# Patient Record
Sex: FEMALE | Race: WHITE
[De-identification: names, ages, dates, MRNs, and addresses within clinical notes are randomized per-mention and may not be internally consistent; named-entity substitution may affect disease eponyms.]

---

## 2017-04-02 ENCOUNTER — HOSPITAL ENCOUNTER (EMERGENCY)
Dept: HOSPITAL 17 - NEPC | Age: 32
Discharge: HOME | End: 2017-04-02
Payer: SELF-PAY

## 2017-04-02 VITALS
SYSTOLIC BLOOD PRESSURE: 173 MMHG | TEMPERATURE: 98.1 F | HEART RATE: 114 BPM | OXYGEN SATURATION: 95 % | RESPIRATION RATE: 20 BRPM | DIASTOLIC BLOOD PRESSURE: 108 MMHG

## 2017-04-02 VITALS — SYSTOLIC BLOOD PRESSURE: 124 MMHG | DIASTOLIC BLOOD PRESSURE: 69 MMHG

## 2017-04-02 VITALS — HEIGHT: 66 IN | WEIGHT: 293 LBS | BODY MASS INDEX: 47.09 KG/M2

## 2017-04-02 VITALS — RESPIRATION RATE: 18 BRPM | HEART RATE: 94 BPM

## 2017-04-02 VITALS
HEART RATE: 108 BPM | SYSTOLIC BLOOD PRESSURE: 173 MMHG | RESPIRATION RATE: 20 BRPM | DIASTOLIC BLOOD PRESSURE: 77 MMHG | OXYGEN SATURATION: 100 %

## 2017-04-02 VITALS — SYSTOLIC BLOOD PRESSURE: 141 MMHG | DIASTOLIC BLOOD PRESSURE: 70 MMHG

## 2017-04-02 DIAGNOSIS — I49.8: ICD-10-CM

## 2017-04-02 DIAGNOSIS — R06.02: ICD-10-CM

## 2017-04-02 DIAGNOSIS — F17.210: ICD-10-CM

## 2017-04-02 DIAGNOSIS — R07.81: Primary | ICD-10-CM

## 2017-04-02 LAB
ALP SERPL-CCNC: 108 U/L (ref 45–117)
ALT SERPL-CCNC: 35 U/L (ref 10–53)
ANION GAP SERPL CALC-SCNC: 9 MEQ/L (ref 5–15)
APTT BLD: 29.2 SEC (ref 24.3–30.1)
AST SERPL-CCNC: 23 U/L (ref 15–37)
BASOPHILS # BLD AUTO: 0.1 TH/MM3 (ref 0–0.2)
BASOPHILS NFR BLD: 0.7 % (ref 0–2)
BILIRUB SERPL-MCNC: 0.2 MG/DL (ref 0.2–1)
BUN SERPL-MCNC: 8 MG/DL (ref 7–18)
CHLORIDE SERPL-SCNC: 104 MEQ/L (ref 98–107)
CK MB SERPL-MCNC: (no result) NG/ML (ref 0.5–3.6)
CK SERPL-CCNC: 104 U/L (ref 26–192)
EOSINOPHIL # BLD: 0.4 TH/MM3 (ref 0–0.4)
EOSINOPHIL NFR BLD: 3.4 % (ref 0–4)
ERYTHROCYTE [DISTWIDTH] IN BLOOD BY AUTOMATED COUNT: 17.4 % (ref 11.6–17.2)
GFR SERPLBLD BASED ON 1.73 SQ M-ARVRAT: 95 ML/MIN (ref 89–?)
HCO3 BLD-SCNC: 25.8 MEQ/L (ref 21–32)
HCT VFR BLD CALC: 35.1 % (ref 35–46)
HEMO FLAGS: (no result)
INR PPP: 0.9 RATIO
LYMPHOCYTES # BLD AUTO: 3 TH/MM3 (ref 1–4.8)
LYMPHOCYTES NFR BLD AUTO: 24 % (ref 9–44)
MAGNESIUM SERPL-MCNC: 1.9 MG/DL (ref 1.5–2.5)
MCH RBC QN AUTO: 21.5 PG (ref 27–34)
MCHC RBC AUTO-ENTMCNC: 31.1 % (ref 32–36)
MCV RBC AUTO: 69.3 FL (ref 80–100)
MONOCYTES NFR BLD: 5.2 % (ref 0–8)
NEUTROPHILS # BLD AUTO: 8.5 TH/MM3 (ref 1.8–7.7)
NEUTROPHILS NFR BLD AUTO: 66.7 % (ref 16–70)
OVALOCYTES BLD QL SMEAR: (no result)
PLAT MORPH BLD: NORMAL
PLATELET # BLD: 470 TH/MM3 (ref 150–450)
PLATELET BLD QL SMEAR: (no result)
POTASSIUM SERPL-SCNC: 3.8 MEQ/L (ref 3.5–5.1)
PROTHROMBIN TIME: 10.4 SEC (ref 9.8–11.6)
RBC # BLD AUTO: 5.07 MIL/MM3 (ref 4–5.3)
SCAN/DIFF: (no result)
SODIUM SERPL-SCNC: 139 MEQ/L (ref 136–145)
WBC # BLD AUTO: 12.7 TH/MM3 (ref 4–11)

## 2017-04-02 PROCEDURE — 71010: CPT

## 2017-04-02 PROCEDURE — 85025 COMPLETE CBC W/AUTO DIFF WBC: CPT

## 2017-04-02 PROCEDURE — 99285 EMERGENCY DEPT VISIT HI MDM: CPT

## 2017-04-02 PROCEDURE — 71275 CT ANGIOGRAPHY CHEST: CPT

## 2017-04-02 PROCEDURE — 82550 ASSAY OF CK (CPK): CPT

## 2017-04-02 PROCEDURE — 82552 ASSAY OF CPK IN BLOOD: CPT

## 2017-04-02 PROCEDURE — 80053 COMPREHEN METABOLIC PANEL: CPT

## 2017-04-02 PROCEDURE — 85730 THROMBOPLASTIN TIME PARTIAL: CPT

## 2017-04-02 PROCEDURE — 85610 PROTHROMBIN TIME: CPT

## 2017-04-02 PROCEDURE — 96361 HYDRATE IV INFUSION ADD-ON: CPT

## 2017-04-02 PROCEDURE — 83735 ASSAY OF MAGNESIUM: CPT

## 2017-04-02 PROCEDURE — 83880 ASSAY OF NATRIURETIC PEPTIDE: CPT

## 2017-04-02 PROCEDURE — 96374 THER/PROPH/DIAG INJ IV PUSH: CPT

## 2017-04-02 PROCEDURE — 85379 FIBRIN DEGRADATION QUANT: CPT

## 2017-04-02 PROCEDURE — 93005 ELECTROCARDIOGRAM TRACING: CPT

## 2017-04-02 PROCEDURE — 84484 ASSAY OF TROPONIN QUANT: CPT

## 2017-04-02 NOTE — PD
HPI


Chief Complaint:  Chest Pain


Time Seen by Provider:  13:32


Travel History


International Travel<30 days:  No


Contact w/Intl Traveler<30days:  No


Traveled to known affect area:  No





History of Present Illness


HPI


32-year-old female presents to the emergency department for evaluation of left 

anterior chest pain.  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  

Describes it as a constant pressure but with sharp pain caused by inspiration.  

States that she has some shortness of breath associated with this pain.  Denies 

any lightheadedness, dizziness, nausea, vomiting, diaphoresis, abdominal pain, 

swelling of the extremities.  Denies any recent cough or cold symptoms.  Denies 

any history of heart disease or MI.  Has never had a stress test or cardiac 

catheterization.  Denies any family history of heart disease.  Denies any 

history of blood clots, recent surgeries, malignancy or taking hormones.  She 

does have a 12 year history of smoking cigarettes.  Denies pregnancy, last 

menstrual period 2 weeks ago.  No other complaints.





PFSH


Past Medical History


Diabetes:  Yes


Pregnant?:  Not Pregnant





Social History


Alcohol Use:  No


Tobacco Use:  Yes





Allergies-Medications


(Allergen,Severity, Reaction):  


Coded Allergies:  


     No Known Allergies (Unverified , 4/2/17)





Review of Systems


Except as stated in HPI:  all other systems reviewed are Neg





Physical Exam


Narrative


GENERAL: Morbidly obese female patient in no acute distress who is nontoxic 

appearing.


SKIN: Warm and dry.


HEAD: Normocephalic and atraumatic. 


EYES: No injection, drainage, or hyphema noted.  PERRLA.  EOMI.  


ENT: No nasal drainage noted.  Oropharynx is clear.


NECK: Supple and the trachea is midline.


CARDIOVASCULAR: Regular rate and rhythm.


RESPIRATORY: Breath sounds are equal bilaterally with no accessory muscle use, 

wheezing, rhonchi, or crackles.


GASTROINTESTINAL: Abdomen is soft, non-tender, and nondistended.  


MUSCULOSKELETAL: No obvious deformities, swelling, cyanosis, or ecchymosis is 

present throughout the upper and lower extremities.  Patient has full range of 

motion without any signs of neurovascular compromise.  


NEUROLOGICAL: Awake, alert, and oriented. Normal speech and gait. Cranial 

nerves are grossly intact.





Data


Data


Last Documented VS





Vital Signs








  Date Time  Temp Pulse Resp B/P Pulse Ox O2 Delivery O2 Flow Rate FiO2


 


4/2/17 13:49    124/69    


 


4/2/17 13:37     99 Room Air  


 


4/2/17 13:27   20     


 


4/2/17 13:27  108      


 


4/2/17 13:22 98.1       








Orders





 Electrocardiogram (4/2/17 )


Ckmb (Isoenzyme) Profile (4/2/17 13:29)


Complete Blood Count With Diff (4/2/17 13:29)


Comprehensive Metabolic Panel (4/2/17 13:29)


Magnesium (Mg) (4/2/17 13:29)


Prothrombin Time / Inr (Pt) (4/2/17 13:29)


Act Partial Throm Time (Ptt) (4/2/17 13:29)


Troponin I (4/2/17 13:29)


Chest, Single Ap (4/2/17 13:29)


Ecg Monitoring (4/2/17 13:29)


Bilateral Bp Monitoring (4/2/17 13:29)


Iv Access Insert/Monitor (4/2/17 13:29)


Oximetry (4/2/17 13:29)


Oxygen Administration (4/2/17 13:29)


Aspirin Chew (Aspirin Chew) (4/2/17 13:30)


Morphine Inj (Morphine Inj) (4/2/17 13:30)


Sodium Chloride 0.9% Flush (Ns Flush) (4/2/17 13:30)


Sodium Chlorid 0.9% 500 Ml Inj (Ns 500 M (4/2/17 13:30)


D-Dimer (4/2/17 13:30)


B-Type Natriuretic Peptide (4/2/17 13:44)


CKMB (4/2/17 14:30)


CKMB% (4/2/17 14:30)


Ct Pulmonary Angiogram (4/2/17 15:04)


Sodium Chlorid 0.9% 500 Ml Inj (Ns 500 M (4/2/17 15:15)


Iohexol 350 Inj (Omnipaque 350 Inj) (4/2/17 16:23)





Labs








 Laboratory Tests








Test 4/2/17





 14:30


 


White Blood Count 12.7 TH/MM3


 


Red Blood Count 5.07 MIL/MM3


 


Hemoglobin 10.9 GM/DL


 


Hematocrit 35.1 %


 


Mean Corpuscular Volume 69.3 FL


 


Mean Corpuscular Hemoglobin 21.5 PG


 


Mean Corpuscular Hemoglobin 31.1 %





Concent 


 


Red Cell Distribution Width 17.4 %


 


Platelet Count 470 TH/MM3


 


Mean Platelet Volume 8.1 FL


 


Neutrophils (%) (Auto) 66.7 %


 


Lymphocytes (%) (Auto) 24.0 %


 


Monocytes (%) (Auto) 5.2 %


 


Eosinophils (%) (Auto) 3.4 %


 


Basophils (%) (Auto) 0.7 %


 


Neutrophils # (Auto) 8.5 TH/MM3


 


Lymphocytes # (Auto) 3.0 TH/MM3


 


Monocytes # (Auto) 0.7 TH/MM3


 


Eosinophils # (Auto) 0.4 TH/MM3


 


Basophils # (Auto) 0.1 TH/MM3


 


CBC Comment AUTO DIFF 


 


Differential Comment AUTO DIFF





 CONFIRMED


 


Platelet Estimate HIGH 


 


Platelet Morphology Comment NORMAL 


 


Ovalocytes 1+ 


 


Prothrombin Time 10.4 SEC


 


Prothromb Time International 0.9 RATIO





Ratio 


 


Activated Partial 29.2 SEC





Thromboplast Time 


 


D-Dimer Quantitative (PE/DVT) 0.68 MG/L FEU


 


Sodium Level 139 MEQ/L


 


Potassium Level 3.8 MEQ/L


 


Chloride Level 104 MEQ/L


 


Carbon Dioxide Level 25.8 MEQ/L


 


Anion Gap 9 MEQ/L


 


Blood Urea Nitrogen 8 MG/DL


 


Creatinine 0.72 MG/DL


 


Estimat Glomerular Filtration 95 ML/MIN





Rate 


 


Random Glucose 133 MG/DL


 


Calcium Level 8.9 MG/DL


 


Magnesium Level 1.9 MG/DL


 


Total Bilirubin 0.2 MG/DL


 


Aspartate Amino Transf 23 U/L





(AST/SGOT) 


 


Alanine Aminotransferase 35 U/L





(ALT/SGPT) 


 


Alkaline Phosphatase 108 U/L


 


Total Creatine Kinase 104 U/L


 


Creatine Kinase MB LESS THAN 0.5





 NG/ML


 


Troponin I LESS THAN 0.02





 NG/ML


 


B-Type Natriuretic Peptide LESS THAN 2





 PG/ML


 


Total Protein 9.0 GM/DL


 


Albumin 3.0 GM/DL














[facility]


Medical Decision Making


Medical Screen Exam Complete:  Yes


Emergency Medical Condition:  Yes


Differential Diagnosis


ACS versus PE versus pleurisy versus pneumonia versus chest wall pain versus 

other


Narrative Course


32-year-old female presents to the emergency department for evaluation of left 

anterior chest pain with shortness of breath that began about 25 minutes ago.  

Patient is afebrile.  She is initially tachycardic with a heart rate of 114 

beats per minute.  She is hypertensive with a blood pressure 173/108.  

Otherwise vital signs are within normal limits.  EKG shows sinus rhythm with no 

acute ST elevations or depressions.  Physical examination is unremarkable.  IV 

access is obtained, labs have been drawn and sent.  Patient is placed on 

cardiac telemetry and pulse oximetry monitoring.





CBC shows slightly elevated white blood cell count 12.7, mild anemia with a 

hemoglobin of 10.9.


CMP is unremarkable.


Troponin is less than 0.02.


BNP is less than 2.


Coags are unremarkable.


D-dimer is elevated at 0.68.


Chest x-ray is negative for any acute abnormalities.


CT pulmonary angiogram shows focal airspace consolidation in left upper lobe 

measuring 4.9 x 3.5 cm.  Negative for PE.





Patient is reassessed and reports complete resolution of symptoms.  States she 

is no longer having any pain and is feeling much better.  Vitals have improved 

and her heart rate is now 85 bpm, blood pressure 124/69.  She is stating she 

hasn't had any cough or cold symptoms at all and therefore I find it unlikely 

that this is pneumonia.  I did discuss with her the consolidation noted on CT 

and that she needs to follow-up and have repeat imaging as an outpatient.  

Patient verbalizes understanding and agreement with treatment plan.





I discussed the case with my attending physician Dr. Tabares who is aware of the 

patients history, physical examination findings, and treatment plan.





Diagnosis





 Primary Impression:  


 Pleuritic chest pain


Referrals:  


[facility]


Patient Instructions:  General Instructions, Pleurisy (ED)





***Additional Instructions:


Your chest CT shows an abnormal consolidation in the left upper lobe.  You need 

to follow-up as an outpatient for repeat imaging.


Take medications as prescribed with food and a full glass of water.


Follow-up with your Primary Care Physician.


Return to the ED for any acute worsening of symptoms.


***Med/Other Pt SpecificInfo:  Prescription(s) given


Scripts


Naproxen 500 Mg [id number] Mg PO BID  7 Days  Ref 0


   Prov:Kalpesh Tabares MD         4/2/17


Disposition:  01 DISCHARGE HOME


Condition:  Stable








Hiwot Tam Apr 2, 2017 13:32

## 2017-04-02 NOTE — PD
Data


Data


Last Documented VS





Vital Signs








  Date Time  Temp Pulse Resp B/P Pulse Ox O2 Delivery O2 Flow Rate FiO2


 


4/2/17 17:02  94 18   Room Air  99


 


4/2/17 13:49    124/69    


 


4/2/17 13:37     99   


 


4/2/17 13:22 98.1       








Orders





 Electrocardiogram (4/2/17 )


Ckmb (Isoenzyme) Profile (4/2/17 13:29)


Complete Blood Count With Diff (4/2/17 13:29)


Comprehensive Metabolic Panel (4/2/17 13:29)


Magnesium (Mg) (4/2/17 13:29)


Prothrombin Time / Inr (Pt) (4/2/17 13:29)


Act Partial Throm Time (Ptt) (4/2/17 13:29)


Troponin I (4/2/17 13:29)


Chest, Single Ap (4/2/17 13:29)


Ecg Monitoring (4/2/17 13:29)


Bilateral Bp Monitoring (4/2/17 13:29)


Iv Access Insert/Monitor (4/2/17 13:29)


Oximetry (4/2/17 13:29)


Oxygen Administration (4/2/17 13:29)


Aspirin Chew (Aspirin Chew) (4/2/17 13:30)


Morphine Inj (Morphine Inj) (4/2/17 13:30)


Sodium Chloride 0.9% Flush (Ns Flush) (4/2/17 13:30)


Sodium Chlorid 0.9% 500 Ml Inj (Ns 500 M (4/2/17 13:30)


D-Dimer (4/2/17 13:30)


B-Type Natriuretic Peptide (4/2/17 13:44)


CKMB (4/2/17 14:30)


CKMB% (4/2/17 14:30)


Ct Pulmonary Angiogram (4/2/17 15:04)


Sodium Chlorid 0.9% 500 Ml Inj (Ns 500 M (4/2/17 15:15)


Iohexol 350 Inj (Omnipaque 350 Inj) (4/2/17 16:23)





Labs





 Laboratory Tests








Test 4/2/17





 14:30


 


White Blood Count 12.7 TH/MM3


 


Red Blood Count 5.07 MIL/MM3


 


Hemoglobin 10.9 GM/DL


 


Hematocrit 35.1 %


 


Mean Corpuscular Volume 69.3 FL


 


Mean Corpuscular Hemoglobin 21.5 PG


 


Mean Corpuscular Hemoglobin 31.1 %





Concent 


 


Red Cell Distribution Width 17.4 %


 


Platelet Count 470 TH/MM3


 


Mean Platelet Volume 8.1 FL


 


Neutrophils (%) (Auto) 66.7 %


 


Lymphocytes (%) (Auto) 24.0 %


 


Monocytes (%) (Auto) 5.2 %


 


Eosinophils (%) (Auto) 3.4 %


 


Basophils (%) (Auto) 0.7 %


 


Neutrophils # (Auto) 8.5 TH/MM3


 


Lymphocytes # (Auto) 3.0 TH/MM3


 


Monocytes # (Auto) 0.7 TH/MM3


 


Eosinophils # (Auto) 0.4 TH/MM3


 


Basophils # (Auto) 0.1 TH/MM3


 


CBC Comment AUTO DIFF 


 


Differential Comment AUTO DIFF





 CONFIRMED


 


Platelet Estimate HIGH 


 


Platelet Morphology Comment NORMAL 


 


Ovalocytes 1+ 


 


Prothrombin Time 10.4 SEC


 


Prothromb Time International 0.9 RATIO





Ratio 


 


Activated Partial 29.2 SEC





Thromboplast Time 


 


D-Dimer Quantitative (PE/DVT) 0.68 MG/L FEU


 


Sodium Level 139 MEQ/L


 


Potassium Level 3.8 MEQ/L


 


Chloride Level 104 MEQ/L


 


Carbon Dioxide Level 25.8 MEQ/L


 


Anion Gap 9 MEQ/L


 


Blood Urea Nitrogen 8 MG/DL


 


Creatinine 0.72 MG/DL


 


Estimat Glomerular Filtration 95 ML/MIN





Rate 


 


Random Glucose 133 MG/DL


 


Calcium Level 8.9 MG/DL


 


Magnesium Level 1.9 MG/DL


 


Total Bilirubin 0.2 MG/DL


 


Aspartate Amino Transf 23 U/L





(AST/SGOT) 


 


Alanine Aminotransferase 35 U/L





(ALT/SGPT) 


 


Alkaline Phosphatase 108 U/L


 


Total Creatine Kinase 104 U/L


 


Creatine Kinase MB LESS THAN 0.5





 NG/ML


 


Troponin I LESS THAN 0.02





 NG/ML


 


B-Type Natriuretic Peptide LESS THAN 2





 PG/ML


 


Total Protein 9.0 GM/DL


 


Albumin 3.0 GM/DL











[facility]


Supervised Visit with JWUAN:  Yes


Narrative Course


The history, exam, and medical decision-making in the associated mid-level 

provider note were completed with my assistance. I reviewed and agree with the 

findings presented.  I attest that I had a face-to-face encounter with the 

patient on the same day, and personally performed and documented my assessment 

and findings in the medical record. 





*My assessment and Findings:





Is an unusual 32-year-old woman who presents with chest pain started fairly 

abruptly.  Its pleuritic in nature.  Imaging just shows some consolidation in 

the left upper lobe.  Would suggest pneumonia but the patient has no infectious 

symptoms such as cough fever or any other pneumonia symptoms.  Possibly 

inflammatory in nature suggesting pleurisy.  We'll treat with NSAIDs.  Patient 

needs repeat imaging for follow-up to make sure there is not underlying 

malignancy although I think this is unlikely.


Diagnosis





 Primary Impression:  


 Pleuritic chest pain


Referrals:  


Primary Care Physician


call for appointment


Patient Instructions:  General Instructions, Pleurisy (ED)


Departure Forms:  Tests/Procedures





***Additional Instruction:


Your chest CT shows an abnormal consolidation in the left upper lobe.  You need 

to follow-up as an outpatient for repeat imaging.


Take medications as prescribed with food and a full glass of water.


Follow-up with your Primary Care Physician.


Return to the ED for any acute worsening of symptoms.


Scripts


Naproxen 500 Mg [id number] Mg PO BID  7 Days  Ref 0


   Prov:Kalpesh Tabares MD         4/2/17


Disposition:  01 DISCHARGE HOME


Condition:  Stable








Kaplesh Tabares MD Apr 2, 2017 17:12

## 2017-04-02 NOTE — RADRPT
EXAM DATE/TIME:  04/02/2017 13:33 

 

[facility] COMPARISON:     

No previous studies available for comparison.

 

                     

INDICATIONS :     

Chest pain. 

                     

 

MEDICAL HISTORY :     

None.       Smoker.    

 

SURGICAL HISTORY :     

None.   

 

ENCOUNTER:     

Initial                                        

 

ACUITY:     

1 day      

 

PAIN SCORE:     

10/10

 

LOCATION:     

Bilateral  Chest

 

FINDINGS:     

A single view of the chest demonstrates the lungs to be symmetrically aerated without evidence of mas
s, infiltrate or effusion.  The cardiomediastinal contours are unremarkable.  Osseous structures are 
intact.

 

CONCLUSION:     No acute disease.  

 

 

 

 Miller Wise MD FACR on April 02, 2017 at 14:00           

Board Certified Radiologist.

 This report was verified electronically.

## 2017-04-02 NOTE — RADRPT
EXAM DATE/TIME:  04/02/2017 15:56 

 

[facility] COMPARISON:     

No previous studies available for comparison.

 

 

INDICATIONS :     

Left sided chest pain today.

                      

 

IV CONTRAST:     

71 cc Omnipaque 350 (iohexol) IV 

 

 

RADIATION DOSE:     

25.94 CTDIvol (mGy) 

 

 

MEDICAL HISTORY :       

diabetes

 

SURGICAL HISTORY :      

None. 

 

ENCOUNTER:      

Initial

 

ACUITY:      

1 day

 

PAIN SCALE:      

5/10

 

LOCATION:       

Left chest 

 

TECHNIQUE:     

Volumetric scanning of the chest was performed using a pulmonary embolism protocol MIP images were re
constructed.  Using automated exposure control and adjustment of the mA and/or kV according to patien
t size, radiation dose was kept as low as reasonably achievable to obtain optimal diagnostic quality 
images. 

 

FINDINGS:     

No filling defects identified to suggest pulmonary embolic disease. There is a focal air space consol
idation in the anterior segment left upper lobe most characteristic of a bronchopneumonia. No effusio
n. Mildly enlarged AP window lymph node measuring about 1.2 x 2.6 cm.

 

CONCLUSION:     

1. Focal air space consolidation left upper lobe measuring up to 4.9 x 3.5 cm. Differential diagnosis
 includes focal pneumonia.

2. Negative for pulmonary embolus.

 

 

 

 Grant Sharma MD on April 02, 2017 at 16:38           

Board Certified Radiologist.

 This report was verified electronically.

## 2017-04-03 NOTE — EKG
Date Performed: 04/02/2017       Time Performed: 13:32:02

 

PTAGE:      32 years

 

EKG:      Sinus rhythm 

 

 WITH SINUS ARRHYTHMIA NORMAL ECG 

 

NO PREVIOUS TRACING            

 

DOCTOR:   Bakari Vogel  Interpretating Date/Time  04/03/2017 10:41:49

## 2017-08-22 ENCOUNTER — HOSPITAL ENCOUNTER (EMERGENCY)
Dept: HOSPITAL 17 - NEPK | Age: 32
Discharge: HOME | End: 2017-08-22
Payer: SELF-PAY

## 2017-08-22 VITALS
TEMPERATURE: 99 F | OXYGEN SATURATION: 99 % | RESPIRATION RATE: 20 BRPM | SYSTOLIC BLOOD PRESSURE: 164 MMHG | DIASTOLIC BLOOD PRESSURE: 94 MMHG | HEART RATE: 85 BPM

## 2017-08-22 VITALS — BODY MASS INDEX: 41.81 KG/M2 | HEIGHT: 66 IN | WEIGHT: 260.15 LBS

## 2017-08-22 DIAGNOSIS — H57.11: Primary | ICD-10-CM

## 2017-08-22 PROCEDURE — 99283 EMERGENCY DEPT VISIT LOW MDM: CPT

## 2017-08-22 NOTE — PD
HPI


Chief Complaint:  Eye Problems/Injury


Time Seen by Provider:  12:27


Travel History


International Travel<30 days:  No


Contact w/Intl Traveler<30days:  No


Traveled to known affect area:  No





History of Present Illness


HPI


32-year-old female presents to emergency Department with complaint of right eye 

pain that started today.  Her symptoms started yesterday with clear drainage 

and redness to her eye which she thought was just allergies because she was 

also having nasal congestion.  Denies getting anything in her eye or eye 

trauma.  Says it feels like she has sand in her eyes.  She does wear contacts 

and states she did not put them back in yesterday morning because of the eye 

irritation.  Reports blurry vision.  Reports photophobia.  Denies fever, 

vomiting.  Has not tried any treatments to alleviate her symptoms.  Symptoms 

are moderate in severity.  No known allergies.  Has no other medical 

complaints.  No other modifying factors or associated signs and symptoms.





PFSH


Past Medical History


Diabetes:  Yes





Past Surgical History


Other Surgery:  Yes (CYST ON BACK)





Social History


Alcohol Use:  No


Tobacco Use:  Yes


Substance Use:  Yes (MARIJUANA QOD)





Allergies-Medications


(Allergen,Severity, Reaction):  


Coded Allergies:  


     No Known Allergies (Unverified , 8/22/17)


Reported Meds & Prescriptions





Reported Meds & Active Scripts


Active


No Active Prescriptions or Reported Medications    








Review of Systems


Except as stated in HPI:  all other systems reviewed are Neg





Physical Exam


Narrative


GENERAL: Well-nourished, well-developed patient, in no acute distress; afebrile

, nontoxic-appearing


SKIN: Warm and dry.


HEAD: Atraumatic. Normocephalic. 


EYES: Pupils equal and round at 3 mm with brisk reaction.   PERRLA.  EOMI. 

visual acuity right 20/40; left 20/30.  Bilateral lid eversion with no foreign 

body noted.  Right eye with scleral erythema and mild lid edema.  No orbital 

tenderness, erythema or cellulitis.  Right eye with photophobia.  No consensual 

photophobia.   No scleral icterus.  Clear drainage.  Woods lamp exam normal.  

Tonometry reading right 15; left 17.


ENT: Mucosa pink and moist.  Airway patent.  


NECK: Trachea midline.  


CARDIOVASCULAR: Regular rate.


RESPIRATORY: No accessory muscle use. 


GASTROINTESTINAL:  Obese.


NEUROLOGICAL: Awake and alert.  Oriented 3.  No obvious cranial nerve 

deficits.  Motor grossly within normal limits. Normal speech. 


PSYCHIATRIC: Appropriate mood and affect; insight and judgment normal.





Data


Data


Last Documented VS





Vital Signs








  Date Time  Temp Pulse Resp B/P (MAP) Pulse Ox O2 Delivery O2 Flow Rate FiO2


 


8/22/17 13:20        


 


8/22/17 11:28 99.0 85 20  99 Room Air  








Orders





 Orders


Proparacaine 0.5% Opth Soln (Alcaine 0.5 (8/22/17 12:30)


Mandatory Outpatient Referral (8/22/17 13:42)








[facility]


Medical Decision Making


Medical Screen Exam Complete:  Yes


Emergency Medical Condition:  Yes


Medical Record Reviewed:  Yes


Differential Diagnosis


Corneal abrasion, ulceration, foreign body, uveitis


Narrative Course


32-year-old female with right eye pain.  Eye exam is unremarkable.  


1304:  Called placed to ophthalmologist.


1314:  Appropriate with Dr. Alaniz, ophthalmologist, and he recommended for the 

patient to come to his office immediately for follow-up.  The patient was 

discharged and sent to Dr. Alaniz's office.  Patient agreed to follow-up with Dr. Alaniz immediately.  Instructed patient to follow up with primary care provider.  

Patient verbalizes understanding and agreement with treatment plan.  Patient is 

medically cleared and stable for discharge.  Discussed reasons to return to the 

emergency department.  Patient agrees with treatment plan.  The patients vital 

signs are stable and the patient is stable for outpatient follow-up and 

treatment.  Patient discharged home, stable and in no acute distress.





Diagnosis





 Primary Impression:  


 Pain, eye, right


Referrals:  


Choco Alaniz MD





Ophthalmologist





Primary Care Physician


Patient Instructions:  Eye Pain (ED), General Instructions





***Additional Instructions:  


Ibuprofen or Tylenol as directed and as needed to reduce pain


Do not patch the eye


Do not rub the eye


Refrigerated eye drops as needed to reduce pain


Cool compresses to the eye as needed to reduce pain


Follow-up with ophthalmology


Primary care provider


Return to the emergency department immediately with worsening of symptoms


***Med/Other Pt SpecificInfo:  No Meds Exist/No RX given


Scripts


No Active Prescriptions or Reported Meds


Disposition:  01 DISCHARGE HOME


Condition:  Stable











Hiwot Merlos Aug 22, 2017 12:27

## 2017-09-15 ENCOUNTER — HOSPITAL ENCOUNTER (EMERGENCY)
Dept: HOSPITAL 17 - NEPD | Age: 32
Discharge: HOME | End: 2017-09-15
Payer: SELF-PAY

## 2017-09-15 VITALS
HEART RATE: 104 BPM | TEMPERATURE: 98.2 F | SYSTOLIC BLOOD PRESSURE: 128 MMHG | OXYGEN SATURATION: 98 % | RESPIRATION RATE: 20 BRPM | DIASTOLIC BLOOD PRESSURE: 72 MMHG

## 2017-09-15 VITALS
RESPIRATION RATE: 20 BRPM | TEMPERATURE: 98 F | HEART RATE: 84 BPM | OXYGEN SATURATION: 100 % | SYSTOLIC BLOOD PRESSURE: 120 MMHG | DIASTOLIC BLOOD PRESSURE: 71 MMHG

## 2017-09-15 VITALS — BODY MASS INDEX: 46.06 KG/M2 | WEIGHT: 286.6 LBS | HEIGHT: 66 IN

## 2017-09-15 VITALS
RESPIRATION RATE: 20 BRPM | OXYGEN SATURATION: 100 % | DIASTOLIC BLOOD PRESSURE: 82 MMHG | HEART RATE: 79 BPM | SYSTOLIC BLOOD PRESSURE: 123 MMHG

## 2017-09-15 DIAGNOSIS — N12: Primary | ICD-10-CM

## 2017-09-15 DIAGNOSIS — N73.9: ICD-10-CM

## 2017-09-15 DIAGNOSIS — E11.9: ICD-10-CM

## 2017-09-15 DIAGNOSIS — F17.210: ICD-10-CM

## 2017-09-15 LAB
ALP SERPL-CCNC: 100 U/L (ref 45–117)
ALT SERPL-CCNC: 31 U/L (ref 10–53)
ANION GAP SERPL CALC-SCNC: 7 MEQ/L (ref 5–15)
APTT BLD: 29.3 SEC (ref 24.3–30.1)
AST SERPL-CCNC: 16 U/L (ref 15–37)
BACTERIA #/AREA URNS HPF: (no result) /HPF
BASOPHILS # BLD AUTO: 0 TH/MM3 (ref 0–0.2)
BASOPHILS NFR BLD: 0.3 % (ref 0–2)
BILIRUB SERPL-MCNC: 0.3 MG/DL (ref 0.2–1)
BUN SERPL-MCNC: 7 MG/DL (ref 7–18)
CHLORIDE SERPL-SCNC: 106 MEQ/L (ref 98–107)
COLOR UR: YELLOW
COMMENT (UR): (no result)
CULTURE IF INDICATED: (no result)
EOSINOPHIL # BLD: 0.2 TH/MM3 (ref 0–0.4)
EOSINOPHIL NFR BLD: 1.7 % (ref 0–4)
ERYTHROCYTE [DISTWIDTH] IN BLOOD BY AUTOMATED COUNT: 18.2 % (ref 11.6–17.2)
GFR SERPLBLD BASED ON 1.73 SQ M-ARVRAT: 92 ML/MIN (ref 89–?)
GLUCOSE UR STRIP-MCNC: (no result) MG/DL
HCO3 BLD-SCNC: 25.7 MEQ/L (ref 21–32)
HCT VFR BLD CALC: 36 % (ref 35–46)
HEMO FLAGS: (no result)
HGB UR QL STRIP: (no result)
INR PPP: 1 RATIO
KETONES UR STRIP-MCNC: (no result) MG/DL
LYMPHOCYTES # BLD AUTO: 2.2 TH/MM3 (ref 1–4.8)
LYMPHOCYTES NFR BLD AUTO: 17.1 % (ref 9–44)
MCH RBC QN AUTO: 21.8 PG (ref 27–34)
MCHC RBC AUTO-ENTMCNC: 30.8 % (ref 32–36)
MCV RBC AUTO: 70.8 FL (ref 80–100)
MONOCYTES NFR BLD: 5.6 % (ref 0–8)
MUCOUS THREADS #/AREA URNS LPF: (no result) /LPF
NEUTROPHILS # BLD AUTO: 9.5 TH/MM3 (ref 1.8–7.7)
NEUTROPHILS NFR BLD AUTO: 75.3 % (ref 16–70)
NITRITE UR QL STRIP: (no result)
PLATELET # BLD: 438 TH/MM3 (ref 150–450)
POTASSIUM SERPL-SCNC: 3.5 MEQ/L (ref 3.5–5.1)
PROTHROMBIN TIME: 10.9 SEC (ref 9.8–11.6)
RBC # BLD AUTO: 5.08 MIL/MM3 (ref 4–5.3)
SODIUM SERPL-SCNC: 139 MEQ/L (ref 136–145)
SP GR UR STRIP: 1.02 (ref 1–1.03)
SQUAMOUS #/AREA URNS HPF: 4 /HPF (ref 0–5)
TRANS CELLS #/AREA URNS HPF: <1 /HPF
WBC # BLD AUTO: 12.7 TH/MM3 (ref 4–11)

## 2017-09-15 PROCEDURE — 85610 PROTHROMBIN TIME: CPT

## 2017-09-15 PROCEDURE — 84703 CHORIONIC GONADOTROPIN ASSAY: CPT

## 2017-09-15 PROCEDURE — 85730 THROMBOPLASTIN TIME PARTIAL: CPT

## 2017-09-15 PROCEDURE — 74177 CT ABD & PELVIS W/CONTRAST: CPT

## 2017-09-15 PROCEDURE — 96375 TX/PRO/DX INJ NEW DRUG ADDON: CPT

## 2017-09-15 PROCEDURE — 99285 EMERGENCY DEPT VISIT HI MDM: CPT

## 2017-09-15 PROCEDURE — 87086 URINE CULTURE/COLONY COUNT: CPT

## 2017-09-15 PROCEDURE — C9113 INJ PANTOPRAZOLE SODIUM, VIA: HCPCS

## 2017-09-15 PROCEDURE — 81001 URINALYSIS AUTO W/SCOPE: CPT

## 2017-09-15 PROCEDURE — 80053 COMPREHEN METABOLIC PANEL: CPT

## 2017-09-15 PROCEDURE — 96361 HYDRATE IV INFUSION ADD-ON: CPT

## 2017-09-15 PROCEDURE — 83690 ASSAY OF LIPASE: CPT

## 2017-09-15 PROCEDURE — 96365 THER/PROPH/DIAG IV INF INIT: CPT

## 2017-09-15 PROCEDURE — 96376 TX/PRO/DX INJ SAME DRUG ADON: CPT

## 2017-09-15 PROCEDURE — 85025 COMPLETE CBC W/AUTO DIFF WBC: CPT

## 2017-09-15 PROCEDURE — 87210 SMEAR WET MOUNT SALINE/INK: CPT

## 2017-09-15 NOTE — PD
HPI


Chief Complaint:  Abdominal Pain


Time Seen by Provider:  13:31


Travel History


International Travel<30 days:  No


Contact w/Intl Traveler<30days:  No


Traveled to known affect area:  No





History of Present Illness


HPI


32-year-old female complains of right flank pain and right upper quadrant 

abdominal pain.  Patient states that the pain started a week ago and got worse 

since last night.  Patient states the pain is sharp pain severe pain localized 

to right flank and right upper quadrant the abdomen.  Patient denies any pain 

radiation.  Patient states the pain is worse with deep breathing and movement.  

Patient denies any nausea vomiting diarrhea.  Patient denies any dysuria or 

frequency.  Patient denies any vaginal discharge or bleeding.  Patient is 2 

weeks late for her menstruation period.  On a scale of 1-10 the pain is a 9.





PFSH


Past Medical History


Diabetes:  Yes


Diminished Hearing:  No


Pregnant?:  Unknown


LMP:  8/3/17





Past Surgical History


Surgical History:  No Previous Surgery


Other Surgery:  Yes (CYST ON BACK)





Social History


Alcohol Use:  Yes


Tobacco Use:  Yes (1/2 ppd)


Substance Use:  Yes





Allergies-Medications


(Allergen,Severity, Reaction):  


Coded Allergies:  


     No Known Allergies (Unverified , 9/15/17)


Reported Meds & Prescriptions





Reported Meds & Active Scripts


Active


Ultram (Tramadol HCl) 50 Mg Tab 50 Mg PO Q6H PRN


Bactrim DS (Sulfamethoxazole-Trimethoprim) 800-160 Mg Tab 1 Tab PO BID








Review of Systems


General / Constitutional:  No: Fever


Eyes:  No: Visual changes


HENT:  No: Headaches


Cardiovascular:  No: Chest Pain or Discomfort


Respiratory:  No: Shortness of Breath


Gastrointestinal:  Positive: Abdominal Pain


Genitourinary:  No: Dysuria


Musculoskeletal:  No: Pain


Skin:  No Rash


Neurologic:  No: Weakness


Psychiatric:  No: Depression


Endocrine:  No: Polydipsia


Hematologic/Lymphatic:  No: Easy Bruising





Physical Exam


Narrative


GENERAL: Well-nourished, well-developed patient.


SKIN: Focused skin assessment warm/dry.


HEAD: Normocephalic.


EYES: No scleral icterus. No injection or drainage. 


NECK: Supple, trachea midline. No JVD or lymphadenopathy.


CARDIOVASCULAR: Regular rate and rhythm without murmurs, gallops, or rubs. 


RESPIRATORY: Breath sounds equal bilaterally. No accessory muscle use.


GASTROINTESTINAL: Abdomen soft,  nondistended.  Patient has moderate tenderness 

on palpation right upper quadrant of the abdomen.  No rebound tenderness.  No 

mass.


MUSCULOSKELETAL: No cyanosis, or edema. 


BACK: Nontender without obvious deformity. No CVA tenderness. 


Neurologic exam normal.





Data


Data


Last Documented VS





Vital Signs








  Date Time  Temp Pulse Resp B/P (MAP) Pulse Ox O2 Delivery O2 Flow Rate FiO2


 


9/15/17 15:58  79 20 123/82 (96) 100 Room Air  


 


9/15/17 14:02 98.0       








Orders





 Orders


Complete Blood Count With Diff (9/15/17 13:36)


Comprehensive Metabolic Panel (9/15/17 13:36)


Lipase (9/15/17 13:36)


Prothrombin Time / Inr (Pt) (9/15/17 13:36)


Act Partial Throm Time (Ptt) (9/15/17 13:36)


Urinalysis - C+S If Indicated (9/15/17 13:36)


Ct Abd/Pel W Iv Contrast(Rout) (9/15/17 13:36)


Iv Access Insert/Monitor (9/15/17 13:36)


Ecg Monitoring (9/15/17 13:36)


Oximetry (9/15/17 13:36)


Morphine Inj (Morphine Inj) (9/15/17 13:45)


Ondansetron Inj (Zofran Inj) (9/15/17 13:45)


Pantoprazole Inj (Protonix Inj) (9/15/17 13:45)


Sodium Chlor 0.9% 1000 Ml Inj (Ns 1000 M (9/15/17 13:36)


Sodium Chloride 0.9% Flush (Ns Flush) (9/15/17 13:45)


Ed Urine Pregnancytest Poc (9/15/17 13:36)


Urine Culture (9/15/17 14:00)


Iohexol 350 Inj (Omnipaque 350 Inj) (9/15/17 12:55)


Morphine Inj (Morphine Inj) (9/15/17 15:45)


Ceftriaxone Inj (Rocephin Inj) (9/15/17 16:15)


Azithromycin Powd Pack (Zithromax Powd P (9/15/17 16:15)


Gc And Chlamydia Pcr (9/15/17 16:19)


Wet Prep Profile (9/15/17 16:19)





Labs





Laboratory Tests








Test


  9/15/17


14:00


 


White Blood Count 12.7 TH/MM3 


 


Red Blood Count 5.08 MIL/MM3 


 


Hemoglobin 11.1 GM/DL 


 


Hematocrit 36.0 % 


 


Mean Corpuscular Volume 70.8 FL 


 


Mean Corpuscular Hemoglobin 21.8 PG 


 


Mean Corpuscular Hemoglobin


Concent 30.8 % 


 


 


Red Cell Distribution Width 18.2 % 


 


Platelet Count 438 TH/MM3 


 


Mean Platelet Volume 7.5 FL 


 


Neutrophils (%) (Auto) 75.3 % 


 


Lymphocytes (%) (Auto) 17.1 % 


 


Monocytes (%) (Auto) 5.6 % 


 


Eosinophils (%) (Auto) 1.7 % 


 


Basophils (%) (Auto) 0.3 % 


 


Neutrophils # (Auto) 9.5 TH/MM3 


 


Lymphocytes # (Auto) 2.2 TH/MM3 


 


Monocytes # (Auto) 0.7 TH/MM3 


 


Eosinophils # (Auto) 0.2 TH/MM3 


 


Basophils # (Auto) 0.0 TH/MM3 


 


CBC Comment DIFF FINAL 


 


Differential Comment  


 


Prothrombin Time 10.9 SEC 


 


Prothromb Time International


Ratio 1.0 RATIO 


 


 


Activated Partial


Thromboplast Time 29.3 SEC 


 


 


Urine Color YELLOW 


 


Urine Turbidity HAZY 


 


Urine pH 5.5 


 


Urine Specific Gravity 1.024 


 


Urine Protein TRACE mg/dL 


 


Urine Glucose (UA) NEG mg/dL 


 


Urine Ketones NEG mg/dL 


 


Urine Occult Blood MOD 


 


Urine Nitrite NEG 


 


Urine Bilirubin NEG 


 


Urine Urobilinogen


  LESS THAN 2.0


MG/DL


 


Urine Leukocyte Esterase LARGE 


 


Urine RBC 47 /hpf 


 


Urine WBC 18 /hpf 


 


Urine Squamous Epithelial


Cells 4 /hpf 


 


 


Urine Transitional Epithelial


Cells <1 /hpf 


 


 


Urine Bacteria FEW /hpf 


 


Urine Mucus FEW /lpf 


 


Microscopic Urinalysis Comment


  CULTURE


INDICATED


 


Blood Urea Nitrogen 7 MG/DL 


 


Creatinine 0.74 MG/DL 


 


Random Glucose 126 MG/DL 


 


Total Protein 8.6 GM/DL 


 


Albumin 2.7 GM/DL 


 


Calcium Level 8.6 MG/DL 


 


Alkaline Phosphatase 100 U/L 


 


Aspartate Amino Transf


(AST/SGOT) 16 U/L 


 


 


Alanine Aminotransferase


(ALT/SGPT) 31 U/L 


 


 


Total Bilirubin 0.3 MG/DL 


 


Sodium Level 139 MEQ/L 


 


Potassium Level 3.5 MEQ/L 


 


Chloride Level 106 MEQ/L 


 


Carbon Dioxide Level 25.7 MEQ/L 


 


Anion Gap 7 MEQ/L 


 


Estimat Glomerular Filtration


Rate 92 ML/MIN 


 


 


Lipase 97 U/L 











MDM


Medical Decision Making


Medical Screen Exam Complete:  Yes


Emergency Medical Condition:  Yes


Interpretation(s)


1604 PM.  CT scan abdomen pelvis shows bilateral nonobstructing renal stone.  

Rule out PID.  CBC WBC 12.7.  Hemoglobin 11.1 hematocrit 36.0.  MCV 70.8.  75 

neutrophil.  CMP within normal limit.  UA positive for WBC and RBC.


Differential Diagnosis


Differential diagnosis including musculoskeletal, acute cholecystitis, 

pyelonephritis, nephrolithiasis, colitis.


Narrative Course


32-year-old female with right upper quadrant abdominal pain and right flank 

pain.  Normal saline solution 1 25 cc an hour.  Morphine 4 mg IV.  Zofran 4 mg 

IV.  Protonix 40 mg IV.  Rocephin 1 g IV.  Zithromax 1 g by mouth.





Diagnosis





 Primary Impression:  


 Pyelonephritis


 Additional Impression:  


 PID (acute pelvic inflammatory disease)


Patient Instructions:  General Instructions





***Additional Instructions:  


Take medications as directed.  Follow-up with personal physician.  Return if 

worse.


***Med/Other Pt SpecificInfo:  Prescription(s) given


Scripts


Doxycycline Hyclate (Doxycycline Hyclate) 100 Mg Cap


100 MG PO BID for Infection, #14 CAP 0 Refills


   Prov: Cristiano Rossi MD         9/15/17 


Tramadol (Ultram) 50 Mg Tab


50 MG PO Q6H Y for PAIN, #20 TAB 0 Refills


   Prov: Cristiano Rossi MD         9/15/17 


Sulfamethoxazole-Trimethoprim (Bactrim DS) 800-160 Mg Tab


1 TAB PO BID for Infection, #20 TAB 0 Refills


   Prov: Cristiano Rossi MD         9/15/17


Disposition:  01 DISCHARGE HOME


Condition:  Stable











Cristiano Rossi MD Sep 15, 2017 13:42

## 2017-09-15 NOTE — RADRPT
EXAM DATE/TIME:  09/15/2017 15:09 

 

[facility] COMPARISON:     

No previous studies available for comparison.

 

 

INDICATIONS :     

Patient complains of right side abdomen pain.

                      

 

IV CONTRAST:     

97 cc Omnipaque 350 (iohexol) IV 

 

 

ORAL CONTRAST:      

No oral contrast ingested.

                      

 

RADIATION DOSE:     

18.93 CTDIvol (mGy) 

 

 

MEDICAL HISTORY :     

Diabetes mellitus type 1.  

 

SURGICAL HISTORY :      

None. 

 

ENCOUNTER:      

Initial

 

ACUITY:      

2 weeks

 

PAIN SCALE:      

8/10

 

LOCATION:       

Right  abdomen

 

TECHNIQUE:     

Volumetric scanning of the abdomen and pelvis was performed.  Using automated exposure control and ad
justment of the mA and/or kV according to patient size, radiation dose was kept as low as reasonably 
achievable to obtain optimal diagnostic quality images.  DICOM format image data is available electro
nically for review and comparison.  

 

FINDINGS:     

 

LOWER LUNGS:     

The visualized lower lungs are clear.

 

LIVER:     

Homogeneous density without lesion.  There is no dilation of the biliary tree.  No calcified gallston
es.

 

SPLEEN:     

Normal size without lesion.

 

PANCREAS:     

Within normal limits.

 

KIDNEYS:     

2 mm calcification or pole right kidney without obstruction or hydronephrosis.  1.2 cm stone pelvis l
eft kidney without obstruction or hydronephrosis.

 

ADRENAL GLANDS:     

Within normal limits.

 

VASCULAR:     

There is no aortic aneurysm.

 

BOWEL/MESENTERY:     

The stomach, small bowel, and colon demonstrate no acute abnormality.  Trace fluid in the pelvis and 
right paracolic gutter.  

 

ABDOMINAL WALL:     

Within normal limits.

 

RETROPERITONEUM:     

There is no lymphadenopathy. 

 

BLADDER:     

No wall thickening or mass. 

 

REPRODUCTIVE:     

Within normal limits.

 

INGUINAL:     

There is no lymphadenopathy or hernia. 

 

MUSCULOSKELETAL:     

Within normal limits for patient age. 

 

CONCLUSION:     

Bilateral renal stones without obstruction or hydronephrosis.  Trace ascites right paracolic gutter. 
 Trace fluid in cul-de-sac.

Pelvic inflammatory disease may be consideration.  

 

 

 Miller Wise MD FACR on September 15, 2017 at 15:53           

Board Certified Radiologist.

 This report was verified electronically.

## 2017-09-15 NOTE — PD
Physical Exam


Time Seen by Provider:  16:28


Narrative


GENITOURINARY: Examined in the presence of the nurse.  Normal external 

genitalia without lesions or erythema.  Vaginal vault without blood but with 

significant yellow drainage. Cervical os was closed with mild drainage. No 

cervical motion tenderness. Uterus nontender and nonenlarged.  Left adnexa is 

tender.  Right adnexa is nontender.  No adnexal masses.





Data


Data


Last Documented VS





Vital Signs








  Date Time  Temp Pulse Resp B/P (MAP) Pulse Ox O2 Delivery O2 Flow Rate FiO2


 


9/15/17 15:58  79 20 123/82 (96) 100 Room Air  


 


9/15/17 14:02 98.0       








Orders





 Orders


Complete Blood Count With Diff (9/15/17 13:36)


Comprehensive Metabolic Panel (9/15/17 13:36)


Lipase (9/15/17 13:36)


Prothrombin Time / Inr (Pt) (9/15/17 13:36)


Act Partial Throm Time (Ptt) (9/15/17 13:36)


Urinalysis - C+S If Indicated (9/15/17 13:36)


Ct Abd/Pel W Iv Contrast(Rout) (9/15/17 13:36)


Iv Access Insert/Monitor (9/15/17 13:36)


Ecg Monitoring (9/15/17 13:36)


Oximetry (9/15/17 13:36)


Morphine Inj (Morphine Inj) (9/15/17 13:45)


Ondansetron Inj (Zofran Inj) (9/15/17 13:45)


Pantoprazole Inj (Protonix Inj) (9/15/17 13:45)


Sodium Chlor 0.9% 1000 Ml Inj (Ns 1000 M (9/15/17 13:36)


Sodium Chloride 0.9% Flush (Ns Flush) (9/15/17 13:45)


Ed Urine Pregnancytest Poc (9/15/17 13:36)


Urine Culture (9/15/17 14:00)


Iohexol 350 Inj (Omnipaque 350 Inj) (9/15/17 12:55)


Morphine Inj (Morphine Inj) (9/15/17 15:45)


Ceftriaxone Inj (Rocephin Inj) (9/15/17 16:15)


Azithromycin Powd Pack (Zithromax Powd P (9/15/17 16:15)


Gc And Chlamydia Pcr (9/15/17 16:19)


Wet Prep Profile (9/15/17 16:19)





Labs





Laboratory Tests








Test


  9/15/17


14:00


 


White Blood Count 12.7 TH/MM3 


 


Red Blood Count 5.08 MIL/MM3 


 


Hemoglobin 11.1 GM/DL 


 


Hematocrit 36.0 % 


 


Mean Corpuscular Volume 70.8 FL 


 


Mean Corpuscular Hemoglobin 21.8 PG 


 


Mean Corpuscular Hemoglobin


Concent 30.8 % 


 


 


Red Cell Distribution Width 18.2 % 


 


Platelet Count 438 TH/MM3 


 


Mean Platelet Volume 7.5 FL 


 


Neutrophils (%) (Auto) 75.3 % 


 


Lymphocytes (%) (Auto) 17.1 % 


 


Monocytes (%) (Auto) 5.6 % 


 


Eosinophils (%) (Auto) 1.7 % 


 


Basophils (%) (Auto) 0.3 % 


 


Neutrophils # (Auto) 9.5 TH/MM3 


 


Lymphocytes # (Auto) 2.2 TH/MM3 


 


Monocytes # (Auto) 0.7 TH/MM3 


 


Eosinophils # (Auto) 0.2 TH/MM3 


 


Basophils # (Auto) 0.0 TH/MM3 


 


CBC Comment DIFF FINAL 


 


Differential Comment  


 


Prothrombin Time 10.9 SEC 


 


Prothromb Time International


Ratio 1.0 RATIO 


 


 


Activated Partial


Thromboplast Time 29.3 SEC 


 


 


Urine Color YELLOW 


 


Urine Turbidity HAZY 


 


Urine pH 5.5 


 


Urine Specific Gravity 1.024 


 


Urine Protein TRACE mg/dL 


 


Urine Glucose (UA) NEG mg/dL 


 


Urine Ketones NEG mg/dL 


 


Urine Occult Blood MOD 


 


Urine Nitrite NEG 


 


Urine Bilirubin NEG 


 


Urine Urobilinogen


  LESS THAN 2.0


MG/DL


 


Urine Leukocyte Esterase LARGE 


 


Urine RBC 47 /hpf 


 


Urine WBC 18 /hpf 


 


Urine Squamous Epithelial


Cells 4 /hpf 


 


 


Urine Transitional Epithelial


Cells <1 /hpf 


 


 


Urine Bacteria FEW /hpf 


 


Urine Mucus FEW /lpf 


 


Microscopic Urinalysis Comment


  CULTURE


INDICATED


 


Blood Urea Nitrogen 7 MG/DL 


 


Creatinine 0.74 MG/DL 


 


Random Glucose 126 MG/DL 


 


Total Protein 8.6 GM/DL 


 


Albumin 2.7 GM/DL 


 


Calcium Level 8.6 MG/DL 


 


Alkaline Phosphatase 100 U/L 


 


Aspartate Amino Transf


(AST/SGOT) 16 U/L 


 


 


Alanine Aminotransferase


(ALT/SGPT) 31 U/L 


 


 


Total Bilirubin 0.3 MG/DL 


 


Sodium Level 139 MEQ/L 


 


Potassium Level 3.5 MEQ/L 


 


Chloride Level 106 MEQ/L 


 


Carbon Dioxide Level 25.7 MEQ/L 


 


Anion Gap 7 MEQ/L 


 


Estimat Glomerular Filtration


Rate 92 ML/MIN 


 


 


Lipase 97 U/L 











MDM


Medical Record Reviewed:  Yes


Supervised Visit with JUWAN:  Yes


Narrative Course


I performed a pelvic exam on this patient.


Diagnosis





 Primary Impression:  


 Pyelonephritis


Patient Instructions:  General Instructions





***Additional Instruction:  


Take medications as directed.  Follow-up with personal physician.  Return if 

worse.


Scripts


Tramadol (Ultram) 50 Mg Tab


50 MG PO Q6H Y for PAIN, #20 TAB 0 Refills


   Prov: Cristiano Rossi MD         9/15/17 


Sulfamethoxazole-Trimethoprim (Bactrim DS) 800-160 Mg Tab


1 TAB PO BID for Infection, #20 TAB 0 Refills


   Prov: Cristiano Rossi MD         9/15/17


Disposition:  01 DISCHARGE HOME


Condition:  Stable











Marilynn Burr Sep 15, 2017 16:29

## 2017-09-24 ENCOUNTER — HOSPITAL ENCOUNTER (EMERGENCY)
Dept: HOSPITAL 17 - NEPD | Age: 32
Discharge: HOME | End: 2017-09-24
Payer: SELF-PAY

## 2017-09-24 VITALS
RESPIRATION RATE: 20 BRPM | OXYGEN SATURATION: 99 % | HEART RATE: 92 BPM | SYSTOLIC BLOOD PRESSURE: 124 MMHG | TEMPERATURE: 97.9 F | DIASTOLIC BLOOD PRESSURE: 75 MMHG

## 2017-09-24 VITALS — HEIGHT: 66 IN | BODY MASS INDEX: 47.09 KG/M2 | WEIGHT: 293 LBS

## 2017-09-24 DIAGNOSIS — N30.01: Primary | ICD-10-CM

## 2017-09-24 PROCEDURE — 99283 EMERGENCY DEPT VISIT LOW MDM: CPT

## 2017-09-24 NOTE — PD
HPI


Chief Complaint:  Gyn Problem/Complaint


Time Seen by Provider:  11:51


Travel History


International Travel<30 days:  No


Contact w/Intl Traveler<30days:  No


Traveled to known affect area:  No





History of Present Illness


HPI


32-year-old female pleasant well-nourished well-developed no acute distress. 

Patient reports crampy abdominal pain.  She reports last menstruation starting 

now.  She states she cannot eat.  She reports history of diagnosis of urinary 

tract infection however never filled her prescription.  She denies here.  

Nausea reported.





PFSH


Past Medical History


Diabetes:  Yes


Patient Takes Glucophage:  No


Diminished Hearing:  No


Pregnant?:  Not Pregnant


LMP:  09/23/17





Past Surgical History


Other Surgery:  Yes (CYST ON BACK)





Social History


Alcohol Use:  Yes


Tobacco Use:  Yes (1/2 ppd)


Substance Use:  Yes





Allergies-Medications


(Allergen,Severity, Reaction):  


Coded Allergies:  


     No Known Allergies (Unverified , 9/24/17)


Reported Meds & Prescriptions





Reported Meds & Active Scripts


Active


Cipro (Ciprofloxacin HCl) 500 Mg Tab 500 Mg PO BID


Doxycycline Hyclate 100 Mg Cap 100 Mg PO BID


Ultram (Tramadol HCl) 50 Mg Tab 50 Mg PO Q6H PRN


Bactrim DS (Sulfamethoxazole-Trimethoprim) 800-160 Mg Tab 1 Tab PO BID








Review of Systems


Except as stated in HPI:  all other systems reviewed are Neg





Physical Exam


Narrative


GENERAL: Well-nourished well-developed 32-year-old female no acute distress


SKIN: Focused skin assessment warm/dry.


HEAD: Atraumatic. Normocephalic. 


EYES: Pupils equal and round. No scleral icterus. No injection or drainage. 


ENT: No nasal bleeding or discharge.  Mucous membranes pink and moist.


NECK: Trachea midline. No JVD. 


CARDIOVASCULAR: Regular rate and rhythm.  No murmur appreciated.


RESPIRATORY: No accessory muscle use. Clear to auscultation. Breath sounds 

equal bilaterally. 


GASTROINTESTINAL: Abdomen soft, non-tender, nondistended. Hepatic and splenic 

margins not palpable. 


MUSCULOSKELETAL: No obvious deformities. No clubbing.  No cyanosis.  No edema. 


NEUROLOGICAL: Awake and alert. No obvious cranial nerve deficits.  Motor 

grossly within normal limits. Normal speech.


PSYCHIATRIC: Appropriate mood and affect; insight and judgment normal.





Data


Data


Last Documented VS





Vital Signs








  Date Time  Temp Pulse Resp B/P (MAP) Pulse Ox O2 Delivery O2 Flow Rate FiO2


 


9/24/17 12:49        


 


9/24/17 10:43 97.9 92 20  99 Room Air  








Orders





 Orders


Ciprofloxacin (Cipro) (9/24/17 12:45)








MDM


Medical Decision Making


Medical Screen Exam Complete:  Yes


Emergency Medical Condition:  Yes


Medical Record Reviewed:  Yes


Differential Diagnosis


IUP, UTI, ectopic pregnancy, ov torsion, appendicitis, TOA, cervicitis, BV, 

Trichomoniasis, ov cyst, hernia, mittelschmerz, pain from menstruation


Narrative Course


Patient presumably has a urinary tract infection.  We'll send her home with 

Cipro.  Return precautions discussed





Diagnosis





 Primary Impression:  


 UTI (urinary tract infection)


 Qualified Codes:  N30.01 - Acute cystitis with hematuria





***Additional Instructions:  


You have a choice when it comes to health care, and we are glad that you chose 

[facility]. Hopefully, we have met your expectations on today's visit.  You 

are welcome to return to [facility] at any time, as we are committed to 

meeting the health care needs of our community.


***Med/Other Pt SpecificInfo:  Prescription(s) given


Scripts


Ciprofloxacin (Cipro) 500 Mg Tab


500 MG PO BID for Infection, #5 TAB 0 Refills


   Prov: Narinder Turk MD         9/24/17


Disposition:  01 DISCHARGE HOME


Condition:  Stable











Narinder Turk MD Sep 24, 2017 12:38

## 2017-12-10 ENCOUNTER — HOSPITAL ENCOUNTER (EMERGENCY)
Dept: HOSPITAL 17 - NEPD | Age: 32
LOS: 1 days | Discharge: HOME | End: 2017-12-11
Payer: SELF-PAY

## 2017-12-10 VITALS — WEIGHT: 286.6 LBS | HEIGHT: 66 IN | BODY MASS INDEX: 46.06 KG/M2

## 2017-12-10 VITALS
OXYGEN SATURATION: 99 % | HEART RATE: 88 BPM | TEMPERATURE: 97.9 F | SYSTOLIC BLOOD PRESSURE: 130 MMHG | DIASTOLIC BLOOD PRESSURE: 75 MMHG | RESPIRATION RATE: 20 BRPM

## 2017-12-10 DIAGNOSIS — Z79.899: ICD-10-CM

## 2017-12-10 DIAGNOSIS — F17.200: ICD-10-CM

## 2017-12-10 DIAGNOSIS — J11.1: Primary | ICD-10-CM

## 2017-12-10 PROCEDURE — 99284 EMERGENCY DEPT VISIT MOD MDM: CPT

## 2017-12-10 NOTE — PD
HPI


Chief Complaint:  Cold / Flu Symptoms


Time Seen by Provider:  23:36


Travel History


International Travel<30 days:  No


Contact w/Intl Traveler<30days:  No


Traveled to known affect area:  No





History of Present Illness


HPI


32-year-old  female presents with a four-day history of subjective 

fever and chills, nasal congestion, sore throat, cough, shortness of breath, 

wheezing, myalgias, arthralgias and general malaise.  Patient denies any 

history of diabetes or asthma.  Patient states symptoms are moderate to severe.

  She does have some posttussive emesis.  Patient denies any abdominal pain, 

dysuria, frequency or rashes.  Patient is a smoker.





PFSH


Past Medical History


Medical History:  Denies Significant Hx


Patient Takes Glucophage:  No


Diminished Hearing:  No


Tetanus Vaccination:  < 5 Years


Pregnant?:  Not Pregnant





Past Surgical History


Other Surgery:  Yes (CYST ON BACK)





Social History


Alcohol Use:  Yes


Tobacco Use:  Yes (1/2 ppd)


Substance Use:  Yes





Allergies-Medications


(Allergen,Severity, Reaction):  


Coded Allergies:  


     No Known Allergies (Unverified , 9/24/17)


Reported Meds & Prescriptions





Reported Meds & Active Scripts


Active


Zofran Odt (Ondansetron Odt) 8 Mg Tab 8 Mg SL Q8H PRN


Proventil Hfa 6.7 GM Inh (Albuterol Sulfate) 90 Mcg/Act Aer 2 Puff INH Q4-6H PRN








Review of Systems


Except as stated in HPI:  all other systems reviewed are Neg





Physical Exam


Narrative


GENERAL:  Well-developed, morbidly obese in no acute distress. Nontoxic 

appearing.


HEAD: Normocephalic, atraumatic.


EYES: Pupils equal round and reactive. Extraocular motions intact. No scleral 

icterus. No injection or drainage. 


ENT: TMs clear without erythema.  The external auditory canals clear.  Nose: 

clear .  Posterior pharynx is pink and moist.  No tonsillar edema or exudate.  

Uvula midline. Airway patent.


NECK: Trachea midline.Supple, nontender, moves head freely.  No central bony 

tenderness or spasm.


CARDIOVASCULAR: Regular rate and rhythm without murmurs, gallops, or rubs. 


RESPIRATORY: Fine expiratory wheeze.  No Rales or rhonchi.


GASTROINTESTINAL: Abdomen soft, non-tender, nondistended. No hepato-splenomegaly

, or palpable masses. No guarding.


EXTREMITIES: No clubbing, cyanosis, or edema. No joint tenderness, effusion, or 

edema noted. 


BACK: Nontender without deformity or crepitance. No flank tenderness.





Data


Data


Last Documented VS





Vital Signs








  Date Time  Temp Pulse Resp B/P (MAP) Pulse Ox O2 Delivery O2 Flow Rate FiO2


 


12/10/17 22:33 97.9 88 20 130/75 (93) 99 Room Air  











MDM


Medical Decision Making


Medical Screen Exam Complete:  Yes


Emergency Medical Condition:  Yes


Medical Record Reviewed:  Yes


Differential Diagnosis


MDM: High


Differential diagnoses: Pneumonia, bronchitis, URI, asthma, RAD, legionnaire's 

disease, SARS, ARDS, influenza, bronchiolitis, RSV,PE,CHF


Narrative Course


Patient is outside the treatment window for influenza.  Patient has an influenza

-like illness.  She'll be given albuterol and subjective care.





Diagnosis





 Primary Impression:  


 Influenza-like illness


Patient Instructions:  General Instructions





***Additional Instructions:  


Rest.


Increase fluids.


Tylenol and Advil.


Robitussin-DM.


Zofran for nausea.


albuterol.


Followup with your Dr. in one week.


Return to the ER for any problems.


***Med/Other Pt SpecificInfo:  Prescription(s) given


Scripts


Ondansetron Odt (Zofran Odt) 8 Mg Tab


8 MG SL Q8H Y for NAUSEA OR VOMITING, #6 TAB 0 Refills


   Prov: Eddie Marcelo MD         12/10/17 


Albuterol 6.7 GM Inh (Proventil Hfa 6.7 GM Inh) 90 Mcg/Act Aer


2 PUFF INH Q4-6H Y for SHORTNESS OF BREATH, #1 INHALER 0 Refills


   Prov: Eddie Marcelo MD         12/10/17


Disposition:  01 DISCHARGE HOME


Condition:  Stable











Grant Ignacio Dec 10, 2017 23:52

## 2018-03-23 ENCOUNTER — HOSPITAL ENCOUNTER (EMERGENCY)
Dept: HOSPITAL 17 - NEPK | Age: 33
Discharge: LEFT BEFORE DISCHARGE | End: 2018-03-23
Payer: SELF-PAY

## 2018-03-23 VITALS
TEMPERATURE: 99.6 F | DIASTOLIC BLOOD PRESSURE: 70 MMHG | RESPIRATION RATE: 16 BRPM | HEART RATE: 97 BPM | OXYGEN SATURATION: 99 % | SYSTOLIC BLOOD PRESSURE: 160 MMHG

## 2018-03-23 VITALS — WEIGHT: 260.15 LBS | BODY MASS INDEX: 41.81 KG/M2 | HEIGHT: 66 IN

## 2018-03-23 DIAGNOSIS — M79.1: ICD-10-CM

## 2018-03-23 DIAGNOSIS — H92.09: ICD-10-CM

## 2018-03-23 DIAGNOSIS — R11.0: ICD-10-CM

## 2018-03-23 DIAGNOSIS — R09.89: ICD-10-CM

## 2018-03-23 DIAGNOSIS — R05: ICD-10-CM

## 2018-03-23 DIAGNOSIS — R19.7: ICD-10-CM

## 2018-03-23 DIAGNOSIS — R09.81: ICD-10-CM

## 2018-03-23 DIAGNOSIS — F17.200: ICD-10-CM

## 2018-03-23 DIAGNOSIS — R50.9: Primary | ICD-10-CM

## 2018-03-23 PROCEDURE — 99281 EMR DPT VST MAYX REQ PHY/QHP: CPT

## 2018-03-23 NOTE — PD
HPI


Chief Complaint:  Cold / Flu Symptoms


Time Seen by Provider:  21:18


Travel History


International Travel<30 days:  No


Contact w/Intl Traveler<30days:  No


Traveled to known affect area:  No





History of Present Illness


HPI


33-year-old  female presents to emergency department with a 4 day 

history of subjective fever, earache, runny nose, sinus congestion, cough, 

nausea and diarrhea.  Positive myalgias and arthralgias.  She denies vomiting.  

No shortness of breath or wheezing.  No dysuria, frequency.  No abdominal pain.

  Symptoms are moderate.  Worse with sneezing and coughing.  No alleviating 

factors.  Using over-the-counter medication without relief.





History


Past Medical Histgory


Medical History:  Denies Significant Hx


Tetanus Vaccination:  < 5 Years


Pregnant?:  Not Pregnant





Past Surgical History


Surgical History:  No Previous Surgery





Social History


Alcohol Use:  Yes


Tobacco Use:  Yes (1/2 ppd)





Allergies-Medications


(Allergen,Severity, Reaction):  


Coded Allergies:  


     No Known Allergies (Unverified  Adverse Reaction, Unknown, 3/23/18)


Reported Meds & Prescriptions





Reported Meds & Active Scripts


Active


Zofran Odt (Ondansetron Odt) 8 Mg Tab 8 Mg SL Q8H PRN


Proventil Hfa 6.7 GM Inh (Albuterol Sulfate) 90 Mcg/Act Aer 2 Puff INH Q4-6H PRN








Review of Systems


Except as stated in HPI:  all other systems reviewed are Neg





Physical Exam


Narrative


GENERAL:  Well-developed, well-nourished in no acute distress. Nontoxic 

appearing.


HEAD: Normocephalic, atraumatic.


EYES: Pupils equal round and reactive. Extraocular motions intact. No scleral 

icterus. No injection or drainage. 


ENT: TMs clear without erythema.  The external auditory canals clear.  Nose: 

clear .  Posterior pharynx is pink and moist.  No tonsillar edema or exudate.  

Uvula midline. Airway patent.


NECK: Trachea midline.Supple, nontender, moves head freely.  No central bony 

tenderness or spasm.


CARDIOVASCULAR: Regular rate and rhythm without murmurs, gallops, or rubs. 


RESPIRATORY: Clear to auscultation. Breath sounds equal bilaterally. No wheezes

, rales, or rhonchi.  


GASTROINTESTINAL: Abdomen soft, non-tender, nondistended. No hepato-splenomegaly

, or palpable masses. No guarding.


EXTREMITIES: No clubbing, cyanosis, or edema. No joint tenderness, effusion, or 

edema noted. 


BACK: Nontender without deformity or crepitance. No flank tenderness.





Data


Data


Last Documented VS





Vital Signs








  Date Time  Temp Pulse Resp B/P (MAP) Pulse Ox O2 Delivery O2 Flow Rate FiO2


 


3/23/18 20:00 99.6 97 16 160/70 (100) 99 Room Air  











MDM


Medical Screen Exam Complete:  Yes


Emergency Medical Condition:  No


Differential Diagnosis


MDM: High


Differential diagnoses: Pneumonia, bronchitis, URI, asthma, RAD, legionnaire's 

disease, SARS, ARDS, influenza, bronchiolitis, RSV,PE,CHF


Narrative Course


A medical screening exam was performed: 


At the time of evaluation the presenting medical condition was determined not 

to be of an emergent nature. 


The patient was given the option of receiving additional care, but declined. 


Patient was given options for additional community resources from which to 

obtain care.


The Patient Has Been advised to seek medical attention for their presenting 

complaint.


The patient has been advised to return to the ER at any time if an emergent 

condition develops.





 Primary Impression:  


 Encounter for medical screening examination


Condition:  Grant Lyn Mar 23, 2018 21:24